# Patient Record
Sex: FEMALE | Race: WHITE
[De-identification: names, ages, dates, MRNs, and addresses within clinical notes are randomized per-mention and may not be internally consistent; named-entity substitution may affect disease eponyms.]

---

## 2017-01-06 LAB
ANION GAP SERPL CALC-SCNC: 9 MMOL/L (ref 3–11)
BASOPHILS # BLD: 0.03 K/UL (ref 0–0.2)
BASOPHILS NFR BLD: 0.4 %
BUN SERPL-MCNC: 18 MG/DL (ref 7–18)
BUN/CREAT SERPL: 25.4 (ref 10–20)
CALCIUM SERPL-MCNC: 9.7 MG/DL (ref 8.5–10.1)
CHLORIDE SERPL-SCNC: 109 MMOL/L (ref 98–107)
CO2 SERPL-SCNC: 27 MMOL/L (ref 21–32)
COMPLETE: YES
CREAT CL PREDICTED SERPL C-G-VRATE: 84.7 ML/MIN
CREAT SERPL-MCNC: 0.71 MG/DL (ref 0.6–1.2)
EOSINOPHIL NFR BLD AUTO: 240 K/UL (ref 130–400)
GLUCOSE SERPL-MCNC: 84 MG/DL (ref 70–99)
HCT VFR BLD CALC: 39.5 % (ref 37–47)
IG%: 0.3 %
IMM GRANULOCYTES NFR BLD AUTO: 24.1 %
LYMPHOCYTES # BLD: 1.61 K/UL (ref 1.2–3.4)
MCH RBC QN AUTO: 27.8 PG (ref 25–34)
MCHC RBC AUTO-ENTMCNC: 33.9 G/DL (ref 32–36)
MCV RBC AUTO: 82 FL (ref 80–100)
MONOCYTES NFR BLD: 6.9 %
NEUTROPHILS # BLD AUTO: 1.5 %
NEUTROPHILS NFR BLD AUTO: 66.8 %
PMV BLD AUTO: 9.1 FL (ref 7.4–10.4)
POTASSIUM SERPL-SCNC: 4.4 MMOL/L (ref 3.5–5.1)
RBC # BLD AUTO: 4.82 M/UL (ref 4.2–5.4)
SODIUM SERPL-SCNC: 145 MMOL/L (ref 136–145)
WBC # BLD AUTO: 6.67 K/UL (ref 4.8–10.8)

## 2017-01-18 ENCOUNTER — HOSPITAL ENCOUNTER (OUTPATIENT)
Dept: HOSPITAL 45 - X.SURG | Age: 62
Discharge: HOME | End: 2017-01-18
Attending: OPHTHALMOLOGY
Payer: COMMERCIAL

## 2017-01-18 VITALS
WEIGHT: 177.91 LBS | BODY MASS INDEX: 31.52 KG/M2 | BODY MASS INDEX: 31.52 KG/M2 | HEIGHT: 62.99 IN | WEIGHT: 177.91 LBS | HEIGHT: 62.99 IN

## 2017-01-18 VITALS — HEART RATE: 73 BPM | DIASTOLIC BLOOD PRESSURE: 91 MMHG | SYSTOLIC BLOOD PRESSURE: 141 MMHG | OXYGEN SATURATION: 98 %

## 2017-01-18 VITALS — TEMPERATURE: 97.52 F

## 2017-01-18 DIAGNOSIS — H25.12: Primary | ICD-10-CM

## 2017-01-18 DIAGNOSIS — Z98.890: ICD-10-CM

## 2017-01-18 DIAGNOSIS — Z83.518: ICD-10-CM

## 2017-01-18 DIAGNOSIS — Z82.49: ICD-10-CM

## 2017-01-18 DIAGNOSIS — H00.19: ICD-10-CM

## 2017-01-18 DIAGNOSIS — E78.5: ICD-10-CM

## 2017-01-18 RX ADMIN — CYCLOPENTOLATE HYDROCHLORIDE SCH DROP: 10 SOLUTION/ DROPS OPHTHALMIC at 08:49

## 2017-01-18 RX ADMIN — MOXIFLOXACIN HYDROCHLORIDE SCH DROP: 5 SOLUTION/ DROPS OPHTHALMIC at 08:45

## 2017-01-18 RX ADMIN — PHENYLEPHRINE HYDROCHLORIDE SCH DROP: 25 SOLUTION/ DROPS OPHTHALMIC at 08:42

## 2017-01-18 RX ADMIN — TROPICAMIDE SCH DROP: 10 SOLUTION/ DROPS OPHTHALMIC at 08:38

## 2017-01-18 RX ADMIN — PHENYLEPHRINE HYDROCHLORIDE SCH DROP: 25 SOLUTION/ DROPS OPHTHALMIC at 08:47

## 2017-01-18 RX ADMIN — CYCLOPENTOLATE HYDROCHLORIDE SCH DROP: 10 SOLUTION/ DROPS OPHTHALMIC at 08:39

## 2017-01-18 RX ADMIN — MOXIFLOXACIN HYDROCHLORIDE SCH DROP: 5 SOLUTION/ DROPS OPHTHALMIC at 08:40

## 2017-01-18 RX ADMIN — TROPICAMIDE SCH DROP: 10 SOLUTION/ DROPS OPHTHALMIC at 08:43

## 2017-01-18 RX ADMIN — PHENYLEPHRINE HYDROCHLORIDE SCH DROP: 25 SOLUTION/ DROPS OPHTHALMIC at 08:37

## 2017-01-18 RX ADMIN — CYCLOPENTOLATE HYDROCHLORIDE SCH DROP: 10 SOLUTION/ DROPS OPHTHALMIC at 08:44

## 2017-01-18 RX ADMIN — MOXIFLOXACIN HYDROCHLORIDE SCH DROP: 5 SOLUTION/ DROPS OPHTHALMIC at 08:50

## 2017-01-18 RX ADMIN — TROPICAMIDE SCH DROP: 10 SOLUTION/ DROPS OPHTHALMIC at 08:48

## 2017-01-18 NOTE — ANESTHESIA PROGRESS NT - [facility]
Anesthesia Post Op Note


Date & Time


Jan 18, 2017 at 10:51





Vital Signs


Pain Intensity:  0





 Vital Signs Past 12 Hours








  Date Time  Temp Pulse Resp B/P Pulse Ox O2 Delivery O2 Flow Rate FiO2


 


1/18/17 10:41 36.4 79 16 147/92 99 Room Air  


 


1/18/17 10:34  79 26  94   


 


1/18/17 10:34 36.4 79 26     


 


1/18/17 10:33    141/96    


 


1/18/17 10:32    143/101    


 


1/18/17 10:29  77 25  95   


 


1/18/17 10:29  82 25     


 


1/18/17 10:28    149/101    


 


1/18/17 10:24  78 25  100   


 


1/18/17 10:24  79 25     


 


1/18/17 10:23    142/92    


 


1/18/17 10:19  89 29  100   


 


1/18/17 10:19  87 29     


 


1/18/17 10:18    139/95    


 


1/18/17 10:14  82 21     


 


1/18/17 10:14  82 21  99   


 


1/18/17 10:13    135/92    


 


1/18/17 10:10 36.2 89 20 132/97 96 Mask 6 


 


1/18/17 10:09  90  132/97 96   


 


1/18/17 10:09  90      


 


1/18/17 08:28 36.7 83 20 145/93 96 Room Air  











Notes


Mental Status:  alert / awake / arousable, participated in evaluation


Pt Amnestic to Procedure:  Yes


Nausea / Vomiting:  adequately controlled


Pain:  adequately controlled


Airway Patency, RR, SpO2:  stable & adequate


BP & HR:  stable & adequate


Hydration State:  stable & adequate


Anesthetic Complications:  no major complications apparent

## 2017-01-18 NOTE — [facility] OPERATIVE REPORT
Operative Report





Phaco with monofocal IOL





DATE OF OPERATION: 1/18/17





PREOPERATIVE DIAGNOSIS: Senile nuclear cataract, left eye





POSTOPERATIVE DIAGNOSIS: Senile nuclear cataract, left eye





PROCEDURE PERFORMED: Phacoemulsification with intraocular lens implantation, 

left eye





SURGEON: Dr. Karlos Waite





ANESTHESIA: LMA general





COMPLICATIONS: None





DESCRIPTION OF PROCEDURE: 


After positively identifying the patient both verbally and by wristband in the 

preoperative area, the left eye was marked as the operative eye. The patient 

was then brought back to the operating room by the anesthesia and nursing staff 

and LMA anesthesia was induced.  The patient was then sterilely prepped and 

draped in the standard fashion typical for ophthalmic surgery.  Steri-strips 

were placed along the upper eyelids to keep the lashes back, and a lid speculum 

was placed into the operative eye.  At this point, a documented time out was 

performed with members of the ophthalmology, nursing, and anesthesia staffs all 

agreeing upon the correct patient, correct location for surgery, correct 

procedure, and correct type and power of intraocular lens to be implanted.  


The microscope was then swung into position. First, a paracentesis wound was 

made using a sideport blade. Then, in sequence, 1% preservative-free lidocaine 

followed by Endocoat viscoelastic was injected into the anterior chamber.  Next

, the main incision was made with a keratome blade in triplanar fashion. A 

sharp cystotome was introduced into the eye and used to create a tear in the 

anterior capsule, which was directed into a continuous curvilinear 

capsulorrhexis using Utrata forceps.  Hydrodissection was then performed with 

BSS on a flat-tip cannula.  Next, the phacoemulsification handpiece was 

introduced into the eye and used to remove the nucleus in a [divide-and-conquer 

or stop-and-chop] fashion.  This was done without complication and then the 

irrigation-aspiration handpiece was introduced into the eye and used to remove 

all remaining cortical and epinuclear material.  Amvisc was then injected into 

the anterior chamber as well as into the capsular bag and using the lens 

injector system, an MX60 21.5 D lens, serial number [id number] and expiration 

date 4/2019 was injected into the capsular bag and rotated into the correct 

position.  Next, the irrigation-aspiration handpiece was used to remove all 

remaining Amvisc.  BSS was used to hydrate the main wound, and then BSS was 

injected into the paracentesis site to reach physiologic pressure and then the 

main wound was checked and found to be watertight.  The patient was given drops 

of Vigamox and Tobradex ointment into the operative eye, and then the 

surrounding area was cleaned and dried.  A clear plastic shield was placed over 

the eye and the patient was then sat up and taken from the operating room by 

the anesthesia staff having tolerated the procedure well and suffering no 

complications.  





DISPOSITION: The patient was returned to the recovery room in stable condition.


I attest to the content of the Intraoperative Record and any orders documented 

therein.  Any exceptions are noted below.

## 2017-01-18 NOTE — DISCHARGE INSTRUCTIONS-[facility]
Discharge Instructions


Visit


Reason for Visit:  Cataract Left Eye





Discharge


Discharge Diagnosis / Problem:  left cataract





Discharge Goals


Goal(s):  Decrease discomfort, Improve function





Activity Recommendations


Activity Limitations:  as noted below





Anesthesia


.





Post Anesthesia Instructions:





If you have had General Anesthesia or IV Sedation:





*  Do not drive today.


*  Resume driving when surgeon permits.


*  Do not make important decisions or sign legal documents today.


*  Call surgeon for:





   1.  Temperature elevations greater than 101 degrees F.


   2.  Uncontrollable pain.


   3.  Excessive bleeding.


   4.  Persistent nausea and vomiting.


   5.  Medication intolerance (nausea, vomiting or rash).





*  For nausea and vomiting use only clear liquids such as: tea, soda, bouillon 

until nausea subsides, then gradually increase diet as tolerated.





*  If you have any concerns or questions, call your surgeon's office.  If 

physician is unavailable and it is an emergency, call 911 or go to the nearest 

emergency room.





.





Instructions / Follow-Up


Instructions / Follow-Up





ACTIVITY RECOMMENDATIONS:





*  Light activities.





*  You may walk outside, read, watch television.





*  You may notice redness on the white part of the eye and some blurry vision - 

this is normal. 








MEDICATIONS:





Resume previous medications unless instructed otherwise by your surgeon.





Start all eye drops at 12 pm today:





*  Eye drops (today):


   Prednisone - one drop in operative eye every 2 hours while awake


            Ofloxacin - one drop in operative eye every  2 hours while awake





SPECIAL CARE INSTRUCTIONS:





*  Tape plastic shield over eye to sleep at night.  





Call your doctor at [phone] with any concerns or problems.








FOLLOW UP VISIT:





Follow-up with Dr Waite at [facility] as scheduled.





Diet Recommendations


Home Diet:  no limitations





Procedures


Procedures Performed:  


Left Cataract Phacoemulsification With Intraocular Lens Implant





Pending Studies


Studies pending at discharge:  no





Medical Emergencies








.


Who to Call and When:





Medical Emergencies:  If at any time you feel your situation is an emergency, 

please call 911 immediately.





.





Non-Emergent Contact


Non-Emergency issues call your:  Surgeon





.


.





"Provider Documentation" section prepared by Karlos Waite.

## 2017-04-13 ENCOUNTER — HOSPITAL ENCOUNTER (OUTPATIENT)
Dept: HOSPITAL 45 - C.LABPVFM | Age: 62
End: 2017-04-13
Attending: GENERAL PRACTICE
Payer: COMMERCIAL

## 2017-04-13 DIAGNOSIS — Z00.00: Primary | ICD-10-CM

## 2017-04-13 DIAGNOSIS — E78.00: ICD-10-CM

## 2017-04-13 LAB
ALP SERPL-CCNC: 82 U/L (ref 45–117)
ALT SERPL-CCNC: 41 U/L (ref 12–78)
AST SERPL-CCNC: 43 U/L (ref 15–37)
CHOLEST/HDLC SERPL: 3.2 {RATIO}
GLUCOSE UR QL: 64 MG/DL
KETONES UR QL STRIP: 99 MG/DL
NITRITE UR QL STRIP: 205 MG/DL (ref 0–150)
PH UR: 204 MG/DL (ref 0–200)
VERY LOW DENSITY LIPOPROT CALC: 41 MG/DL

## 2017-04-25 ENCOUNTER — HOSPITAL ENCOUNTER (OUTPATIENT)
Dept: HOSPITAL 45 - C.LABPVFM | Age: 62
Discharge: HOME | End: 2017-04-25
Attending: GENERAL PRACTICE
Payer: COMMERCIAL

## 2017-04-25 DIAGNOSIS — Z11.59: Primary | ICD-10-CM

## 2017-08-23 ENCOUNTER — HOSPITAL ENCOUNTER (OUTPATIENT)
Dept: HOSPITAL 45 - C.MAMM | Age: 62
Discharge: HOME | End: 2017-08-23
Attending: OBSTETRICS & GYNECOLOGY
Payer: COMMERCIAL

## 2017-08-23 ENCOUNTER — HOSPITAL ENCOUNTER (OUTPATIENT)
Dept: HOSPITAL 45 - C.PAPS | Age: 62
End: 2017-08-23
Attending: OBSTETRICS & GYNECOLOGY
Payer: COMMERCIAL

## 2017-08-23 DIAGNOSIS — Z01.419: Primary | ICD-10-CM

## 2017-08-23 DIAGNOSIS — Z78.0: ICD-10-CM

## 2017-08-23 DIAGNOSIS — Z12.31: Primary | ICD-10-CM

## 2017-08-23 DIAGNOSIS — Z11.51: ICD-10-CM

## 2017-08-23 NOTE — MAMMOGRAPHY REPORT
BILATERAL DIGITAL SCREENING MAMMOGRAM TOMOSYNTHESIS WITH CAD: 8/23/2017

CLINICAL HISTORY: Routine screening.  Patient has no complaints.  





TECHNIQUE:  Breast tomosynthesis in addition to standard 2D mammography was performed. Current study 
was also evaluated with a Computer Aided Detection (CAD) system.  



COMPARISON: Comparison is made to exams dated:  8/17/2016 mammogram, 8/13/2015 mammogram, 8/7/2014 ma
mmogram, 7/31/2013 mammogram, 7/20/2012 mammogram, and 1/31/2012 mammogram - [facility]
nter.   



BREAST COMPOSITION:  The tissue of both breasts is heterogeneously dense, which may obscure small mas
ses.  



FINDINGS: There is a stable metallic biopsy marker clip in the upper outer posterior left breast.  A 
few stable punctate microcalcifications are also seen in the adjacent left upper outer quadrant.  No 
new suspicious mass, architectural distortion or cluster of microcalcifications is identified bilater
ally.



IMPRESSION:  ACR BI-RADS CATEGORY 1: NEGATIVE

There is no mammographic evidence of malignancy. A 1 year screening mammogram is recommended.  The pa
tient will receive written notification of the results.  





Approximately 10% of breast cancers are not detected with mammography. A negative mammographic report
 should not delay biopsy if a clinically suggestive mass is present.



Nela Jennings M.D.          

ay/:8/23/2017 12:08:13  



Imaging Technologist: Nellie CHAVEZ([facility])([place]AN)([place]), [facility]

letter sent: Normal 1/2  

BI-RADS Code: ACR BI-RADS Category 1: Negative

## 2017-09-29 ENCOUNTER — HOSPITAL ENCOUNTER (OUTPATIENT)
Dept: HOSPITAL 45 - C.LABPVFM | Age: 62
Discharge: HOME | End: 2017-09-29
Attending: GENERAL PRACTICE
Payer: COMMERCIAL

## 2017-09-29 DIAGNOSIS — E78.00: Primary | ICD-10-CM

## 2017-09-29 DIAGNOSIS — R74.0: ICD-10-CM

## 2017-09-29 LAB
ALBUMIN/GLOB SERPL: 1.1 {RATIO} (ref 0.9–2)
ALP SERPL-CCNC: 76 U/L (ref 45–117)
ALT SERPL-CCNC: 33 U/L (ref 12–78)
ANION GAP SERPL CALC-SCNC: 7 MMOL/L (ref 3–11)
AST SERPL-CCNC: 33 U/L (ref 15–37)
BUN SERPL-MCNC: 10 MG/DL (ref 7–18)
BUN/CREAT SERPL: 12.3 (ref 10–20)
CALCIUM SERPL-MCNC: 9.3 MG/DL (ref 8.5–10.1)
CHLORIDE SERPL-SCNC: 109 MMOL/L (ref 98–107)
CHOLEST/HDLC SERPL: 3.4 {RATIO}
CO2 SERPL-SCNC: 27 MMOL/L (ref 21–32)
CREAT SERPL-MCNC: 0.84 MG/DL (ref 0.6–1.2)
GLOBULIN SER-MCNC: 3.4 GM/DL (ref 2.5–4)
GLUCOSE SERPL-MCNC: 94 MG/DL (ref 70–99)
GLUCOSE UR QL: 59 MG/DL
KETONES UR QL STRIP: 105 MG/DL
NITRITE UR QL STRIP: 197 MG/DL (ref 0–150)
PH UR: 203 MG/DL (ref 0–200)
POTASSIUM SERPL-SCNC: 3.8 MMOL/L (ref 3.5–5.1)
SODIUM SERPL-SCNC: 143 MMOL/L (ref 136–145)
VERY LOW DENSITY LIPOPROT CALC: 39 MG/DL

## 2018-03-09 ENCOUNTER — HOSPITAL ENCOUNTER (OUTPATIENT)
Dept: HOSPITAL 45 - C.LABPVFM | Age: 63
Discharge: HOME | End: 2018-03-09
Attending: FAMILY MEDICINE
Payer: COMMERCIAL

## 2018-03-09 DIAGNOSIS — R74.0: ICD-10-CM

## 2018-03-09 DIAGNOSIS — E78.00: Primary | ICD-10-CM

## 2018-03-09 LAB
ALBUMIN SERPL-MCNC: 3.8 GM/DL (ref 3.4–5)
ALP SERPL-CCNC: 79 U/L (ref 45–117)
ALT SERPL-CCNC: 57 U/L (ref 12–78)
AST SERPL-CCNC: 57 U/L (ref 15–37)
BUN SERPL-MCNC: 14 MG/DL (ref 7–18)
CALCIUM SERPL-MCNC: 9.2 MG/DL (ref 8.5–10.1)
CO2 SERPL-SCNC: 27 MMOL/L (ref 21–32)
CREAT SERPL-MCNC: 0.74 MG/DL (ref 0.6–1.2)
GLUCOSE SERPL-MCNC: 91 MG/DL (ref 70–99)
KETONES UR QL STRIP: 84 MG/DL
PH UR: 195 MG/DL (ref 0–200)
POTASSIUM SERPL-SCNC: 4.1 MMOL/L (ref 3.5–5.1)
PROT SERPL-MCNC: 7.1 GM/DL (ref 6.4–8.2)
SODIUM SERPL-SCNC: 139 MMOL/L (ref 136–145)

## 2018-07-27 ENCOUNTER — HOSPITAL ENCOUNTER (OUTPATIENT)
Dept: HOSPITAL 45 - C.LABPVFM | Age: 63
Discharge: HOME | End: 2018-07-27
Attending: FAMILY MEDICINE
Payer: COMMERCIAL

## 2018-07-27 DIAGNOSIS — K76.0: ICD-10-CM

## 2018-07-27 DIAGNOSIS — R74.0: ICD-10-CM

## 2018-07-27 DIAGNOSIS — G43.909: ICD-10-CM

## 2018-07-27 DIAGNOSIS — E78.00: Primary | ICD-10-CM

## 2018-07-27 LAB
ALBUMIN SERPL-MCNC: 4 GM/DL (ref 3.4–5)
ALP SERPL-CCNC: 68 U/L (ref 45–117)
ALT SERPL-CCNC: 35 U/L (ref 12–78)
AST SERPL-CCNC: 35 U/L (ref 15–37)
BUN SERPL-MCNC: 15 MG/DL (ref 7–18)
CALCIUM SERPL-MCNC: 9.2 MG/DL (ref 8.5–10.1)
CO2 SERPL-SCNC: 26 MMOL/L (ref 21–32)
CREAT SERPL-MCNC: 0.86 MG/DL (ref 0.6–1.2)
GLUCOSE SERPL-MCNC: 89 MG/DL (ref 70–99)
KETONES UR QL STRIP: 82 MG/DL
PH UR: 174 MG/DL (ref 0–200)
POTASSIUM SERPL-SCNC: 4.1 MMOL/L (ref 3.5–5.1)
PROT SERPL-MCNC: 7.4 GM/DL (ref 6.4–8.2)
SODIUM SERPL-SCNC: 142 MMOL/L (ref 136–145)

## 2018-08-27 ENCOUNTER — HOSPITAL ENCOUNTER (OUTPATIENT)
Dept: HOSPITAL 45 - C.MAMM | Age: 63
Discharge: HOME | End: 2018-08-27
Attending: OBSTETRICS & GYNECOLOGY
Payer: COMMERCIAL

## 2018-08-27 DIAGNOSIS — Z12.31: Primary | ICD-10-CM

## 2018-08-27 NOTE — MAMMOGRAPHY REPORT
BILATERAL DIGITAL SCREENING MAMMOGRAM TOMOSYNTHESIS WITH CAD: 8/27/2018

CLINICAL HISTORY: Routine screening. Patient has no complaints.  





TECHNIQUE: Breast tomosynthesis in addition to standard 2D mammography was performed. Current study w
as also evaluated with a Computer Aided Detection (CAD) system.  



COMPARISON: Comparison is made to exams dated:  8/23/2017 mammogram, 8/17/2016 mammogram, 8/13/2015 m
ammogram, 7/20/2012 mammogram, 7/31/2013 mammogram, and 8/7/2014 mammogram - [facility]
nter.   

BREAST COMPOSITION: The tissue of both breasts is heterogeneously dense, which may obscure small mass
es.  



FINDINGS: 

No suspicious masses, calcifications, or areas of architectural distortion are noted in either breast
. There has been no significant interval change compared to prior exams.  A biopsy clip is again note
d within the left 3:00 breast.  Bilateral asymmetries and bilateral benign-appearing calcifications a
re not significantly changed.





IMPRESSION: ACR BI-RADS CATEGORY 2: BENIGN

There is no mammographic evidence of malignancy. A 1 year screening mammogram is recommended.(08/28/2
019)  The patient will receive written notification of the results.  





Some breast cancers are not detected with mammography. A negative mammographic report should not juwan
y biopsy if a clinically suggestive mass is present.



Dotty Romero M.D.          

ah/:8/27/2018 10:03:53  



Imaging Technologist: RT Atiya([facility]ISE)([place]), [facility]

letter sent: Normal 1/2  

BI-RADS Code: ACR BI-RADS Category 2: Benign